# Patient Record
Sex: FEMALE | Race: WHITE | NOT HISPANIC OR LATINO | Employment: FULL TIME | RURAL
[De-identification: names, ages, dates, MRNs, and addresses within clinical notes are randomized per-mention and may not be internally consistent; named-entity substitution may affect disease eponyms.]

---

## 2020-07-08 ENCOUNTER — OFFICE VISIT (OUTPATIENT)
Dept: FAMILY MEDICINE CLINIC | Facility: CLINIC | Age: 55
End: 2020-07-08

## 2020-07-08 VITALS
WEIGHT: 127.4 LBS | HEIGHT: 63 IN | SYSTOLIC BLOOD PRESSURE: 141 MMHG | OXYGEN SATURATION: 97 % | HEART RATE: 70 BPM | DIASTOLIC BLOOD PRESSURE: 86 MMHG | TEMPERATURE: 97.1 F | BODY MASS INDEX: 22.57 KG/M2 | RESPIRATION RATE: 18 BRPM

## 2020-07-08 DIAGNOSIS — M25.532 LEFT WRIST PAIN: Primary | ICD-10-CM

## 2020-07-08 PROCEDURE — 99213 OFFICE O/P EST LOW 20 MIN: CPT | Performed by: FAMILY MEDICINE

## 2020-07-08 RX ORDER — LEVOTHYROXINE SODIUM 137 UG/1
TABLET ORAL
COMMUNITY
Start: 2020-07-01

## 2020-07-08 RX ORDER — ALPRAZOLAM 0.25 MG/1
0.25 TABLET ORAL 3 TIMES DAILY PRN
COMMUNITY
Start: 2020-05-06

## 2020-07-08 RX ORDER — PREDNISONE 1 MG/1
5 TABLET ORAL DAILY
Qty: 45 TABLET | Refills: 0 | Status: SHIPPED | OUTPATIENT
Start: 2020-07-08

## 2020-07-08 RX ORDER — MELOXICAM 15 MG/1
15 TABLET ORAL DAILY
Qty: 30 TABLET | Refills: 2 | Status: SHIPPED | OUTPATIENT
Start: 2020-07-08

## 2020-07-08 NOTE — PROGRESS NOTES
Subjective   Olivia Rangel is a 54 y.o. female.     Chief Complaint   Patient presents with   • Wrist Pain       Worker Comp:  Date of Injury: 2 months ago  Type of injury: repetitive injury  Affected Area: left wrist  How the incident occurred: repetitive work   Prior treatment: ice and aspirin    Wrist Pain    The pain is present in the left wrist. This is a recurrent problem. The current episode started more than 1 month ago. The problem occurs constantly. The problem has been gradually worsening. The quality of the pain is described as aching, sharp and burning. The pain is at a severity of 10/10. The pain is moderate. Pertinent negatives include no fever, itching, joint locking, joint swelling, numbness or tingling. The symptoms are aggravated by activity. She has tried cold and acetaminophen for the symptoms. The treatment provided mild relief.            I personally reviewed and updated the patient's allergies, medications, problem list, and past medical, surgical, social, and family history.     Family History   Problem Relation Age of Onset   • Aneurysm Mother    • Stroke Mother    • Hypertension Mother    • Rheum arthritis Father        Social History     Tobacco Use   • Smoking status: Former Smoker   • Smokeless tobacco: Never Used   Substance Use Topics   • Alcohol use: Yes     Frequency: 4 or more times a week     Drinks per session: 1 or 2     Binge frequency: Never   • Drug use: Never       Past Surgical History:   Procedure Laterality Date   • ANKLE SURGERY         Patient Active Problem List   Diagnosis   • Left wrist pain         Current Outpatient Medications:   •  ALPRAZolam (XANAX) 0.25 MG tablet, Take 0.25 mg by mouth 3 (Three) Times a Day As Needed., Disp: , Rfl:   •  levothyroxine (SYNTHROID, LEVOTHROID) 137 MCG tablet, , Disp: , Rfl:   •  meloxicam (MOBIC) 15 MG tablet, Take 1 tablet by mouth Daily., Disp: 30 tablet, Rfl: 2  •  predniSONE (DELTASONE) 5 MG tablet, Take 1 tablet by mouth  "Daily. 40mg x 3 days, 20mg x 3 days, 10mg x 3 days, 5mg x 3 days, Disp: 45 tablet, Rfl: 0         Review of Systems   Constitutional: Negative for chills, diaphoresis and fever.   Eyes: Negative for visual disturbance.   Respiratory: Negative for shortness of breath.    Cardiovascular: Negative for chest pain and palpitations.   Gastrointestinal: Negative for abdominal pain and nausea.   Endocrine: Negative for polydipsia and polyphagia.   Musculoskeletal: Negative for neck stiffness.   Skin: Negative for color change, itching and pallor.   Neurological: Negative for tingling, seizures, syncope and numbness.   Hematological: Negative for adenopathy.       I have reviewed and confirmed the accuracy of the ROS as documented by the MA/LPN/RN Robert Koehler MD      Objective   /86 (BP Location: Right arm, Patient Position: Sitting, Cuff Size: Adult)   Pulse 70   Temp 97.1 °F (36.2 °C)   Resp 18   Ht 160 cm (63\")   Wt 57.8 kg (127 lb 6.4 oz)   SpO2 97%   BMI 22.57 kg/m²   BP Readings from Last 3 Encounters:   07/08/20 141/86     Wt Readings from Last 3 Encounters:   07/08/20 57.8 kg (127 lb 6.4 oz)     Physical Exam   Constitutional: She is oriented to person, place, and time. She appears well-developed and well-nourished.   Cardiovascular: Normal rate, regular rhythm, S1 normal, S2 normal, normal heart sounds, intact distal pulses and normal pulses. Exam reveals no gallop and no friction rub.   No murmur heard.  Pulmonary/Chest: Effort normal and breath sounds normal. No accessory muscle usage or stridor. She has no decreased breath sounds. She has no wheezes. She has no rhonchi. She has no rales.   Abdominal: Soft. Normal appearance, normal aorta and bowel sounds are normal. She exhibits no distension, no pulsatile midline mass and no mass. There is no hepatosplenomegaly. There is no tenderness. There is no rigidity, no rebound, no guarding, no CVA tenderness and negative Ahumada's sign. No hernia. "   Musculoskeletal: Normal range of motion.        Right wrist: Normal. She exhibits normal range of motion, no tenderness, no swelling, no effusion, no crepitus and no deformity.        Left wrist: Normal. She exhibits normal range of motion, no tenderness, no swelling, no effusion, no crepitus and no deformity.        Right forearm: Normal. She exhibits no tenderness, no swelling, no edema and no deformity.        Left forearm: Normal. She exhibits no tenderness, no swelling, no edema and no deformity.        Right hand: Normal. She exhibits normal range of motion, no tenderness, normal two-point discrimination, no deformity and no swelling. Normal sensation noted. Decreased sensation is not present in the ulnar distribution, is not present in the medial distribution and is not present in the radial distribution. Normal strength noted. She exhibits no finger abduction, no thumb/finger opposition and no wrist extension trouble.        Left hand: Normal. She exhibits normal range of motion, no tenderness, normal two-point discrimination, no deformity and no swelling. Normal sensation noted. Decreased sensation is not present in the ulnar distribution, is not present in the medial redistribution and is not present in the radial distribution. Normal strength noted. She exhibits no finger abduction, no thumb/finger opposition and no wrist extension trouble.   Tinel's, Phalen's neg.  Finkelstein +.   Neurological: She is alert and oriented to person, place, and time. Coordination and gait normal.   Skin: Skin is warm and dry. Turgor is normal. She is not diaphoretic. No pallor.         Assessment/Plan      Medications        Problem List         LOS    De Quervain's tenosynovitis.  Osteoarthritis contributing.  Start prednisone, meloxicam.  Ice, rehabilitation exercises discussed.  PRN bracing okay.  Call return if persistent symptoms.  Risk and benefit of prednisone Rx discussed.      Problem List Items Addressed This  Visit        Unprioritized    Left wrist pain - Primary              Expected course, medications, and adverse effects discussed.  Call or return if worsening or persistent symptoms.